# Patient Record
Sex: MALE | Race: WHITE | NOT HISPANIC OR LATINO | URBAN - METROPOLITAN AREA
[De-identification: names, ages, dates, MRNs, and addresses within clinical notes are randomized per-mention and may not be internally consistent; named-entity substitution may affect disease eponyms.]

---

## 2020-02-20 ENCOUNTER — TRANSCRIBE ORDERS (OUTPATIENT)
Dept: ADMINISTRATIVE | Facility: HOSPITAL | Age: 65
End: 2020-02-20

## 2020-02-20 DIAGNOSIS — K11.5 SIALOLITHIASIS: Primary | ICD-10-CM

## 2020-02-26 ENCOUNTER — TRANSCRIBE ORDERS (OUTPATIENT)
Dept: ADMINISTRATIVE | Facility: HOSPITAL | Age: 65
End: 2020-02-26

## 2020-02-26 DIAGNOSIS — R74.8 ELEVATED LIVER ENZYMES: Primary | ICD-10-CM

## 2020-03-09 ENCOUNTER — HOSPITAL ENCOUNTER (OUTPATIENT)
Dept: CT IMAGING | Facility: HOSPITAL | Age: 65
Discharge: HOME | End: 2020-03-09
Admitting: DENTIST

## 2020-03-09 ENCOUNTER — HOSPITAL ENCOUNTER (OUTPATIENT)
Dept: ULTRASOUND IMAGING | Facility: HOSPITAL | Age: 65
Discharge: HOME | End: 2020-03-09

## 2020-03-09 DIAGNOSIS — R74.8 ELEVATED LIVER ENZYMES: ICD-10-CM

## 2020-03-09 DIAGNOSIS — K11.5 SIALOLITHIASIS: ICD-10-CM

## 2020-03-09 PROCEDURE — 70490 CT SOFT TISSUE NECK W/O DYE: CPT

## 2020-03-09 PROCEDURE — 76705 ECHO EXAM OF ABDOMEN: CPT

## 2021-03-16 ENCOUNTER — BULK ORDERING (OUTPATIENT)
Dept: CASE MANAGEMENT | Facility: OTHER | Age: 66
End: 2021-03-16

## 2021-03-16 DIAGNOSIS — Z23 IMMUNIZATION DUE: ICD-10-CM

## 2021-05-21 RX ORDER — ROSUVASTATIN CALCIUM 10 MG/1
10 TABLET, COATED ORAL DAILY
COMMUNITY

## 2021-05-21 RX ORDER — PAROXETINE 10 MG/1
5 TABLET, FILM COATED ORAL EVERY MORNING
COMMUNITY

## 2021-05-21 RX ORDER — LISINOPRIL 10 MG/1
10 TABLET ORAL DAILY
COMMUNITY

## 2021-05-24 ENCOUNTER — ANESTHESIA EVENT (OUTPATIENT)
Dept: GASTROENTEROLOGY | Facility: HOSPITAL | Age: 66
End: 2021-05-24

## 2021-05-24 ENCOUNTER — HOSPITAL ENCOUNTER (OUTPATIENT)
Facility: HOSPITAL | Age: 66
Setting detail: HOSPITAL OUTPATIENT SURGERY
Discharge: HOME | End: 2021-05-24
Attending: SURGERY | Admitting: SURGERY

## 2021-05-24 ENCOUNTER — ANESTHESIA (OUTPATIENT)
Dept: GASTROENTEROLOGY | Facility: HOSPITAL | Age: 66
End: 2021-05-24

## 2021-05-24 VITALS
SYSTOLIC BLOOD PRESSURE: 124 MMHG | DIASTOLIC BLOOD PRESSURE: 65 MMHG | TEMPERATURE: 98.1 F | RESPIRATION RATE: 18 BRPM | HEIGHT: 72 IN | BODY MASS INDEX: 29.84 KG/M2 | WEIGHT: 220.3 LBS | OXYGEN SATURATION: 95 % | HEART RATE: 86 BPM

## 2021-05-24 DIAGNOSIS — Z12.11 SCREENING FOR COLON CANCER: ICD-10-CM

## 2021-05-24 PROCEDURE — 88305 TISSUE EXAM BY PATHOLOGIST: CPT | Performed by: SURGERY

## 2021-05-24 PROCEDURE — 25010000002 PROPOFOL 10 MG/ML EMULSION: Performed by: ANESTHESIOLOGY

## 2021-05-24 RX ORDER — BUDESONIDE AND FORMOTEROL FUMARATE DIHYDRATE 80; 4.5 UG/1; UG/1
2 AEROSOL RESPIRATORY (INHALATION)
COMMUNITY

## 2021-05-24 RX ORDER — PROPOFOL 10 MG/ML
VIAL (ML) INTRAVENOUS AS NEEDED
Status: DISCONTINUED | OUTPATIENT
Start: 2021-05-24 | End: 2021-05-24 | Stop reason: SURG

## 2021-05-24 RX ORDER — SODIUM CHLORIDE, SODIUM LACTATE, POTASSIUM CHLORIDE, CALCIUM CHLORIDE 600; 310; 30; 20 MG/100ML; MG/100ML; MG/100ML; MG/100ML
30 INJECTION, SOLUTION INTRAVENOUS CONTINUOUS PRN
Status: DISCONTINUED | OUTPATIENT
Start: 2021-05-24 | End: 2021-05-24 | Stop reason: HOSPADM

## 2021-05-24 RX ORDER — SODIUM CHLORIDE 0.9 % (FLUSH) 0.9 %
3 SYRINGE (ML) INJECTION EVERY 12 HOURS SCHEDULED
Status: DISCONTINUED | OUTPATIENT
Start: 2021-05-24 | End: 2021-05-24 | Stop reason: HOSPADM

## 2021-05-24 RX ORDER — LIDOCAINE HYDROCHLORIDE 20 MG/ML
INJECTION, SOLUTION INFILTRATION; PERINEURAL AS NEEDED
Status: DISCONTINUED | OUTPATIENT
Start: 2021-05-24 | End: 2021-05-24 | Stop reason: SURG

## 2021-05-24 RX ORDER — SODIUM CHLORIDE 0.9 % (FLUSH) 0.9 %
10 SYRINGE (ML) INJECTION AS NEEDED
Status: DISCONTINUED | OUTPATIENT
Start: 2021-05-24 | End: 2021-05-24 | Stop reason: HOSPADM

## 2021-05-24 RX ORDER — PROPOFOL 10 MG/ML
VIAL (ML) INTRAVENOUS CONTINUOUS PRN
Status: DISCONTINUED | OUTPATIENT
Start: 2021-05-24 | End: 2021-05-24 | Stop reason: SURG

## 2021-05-24 RX ADMIN — PROPOFOL 100 MG: 10 INJECTION, EMULSION INTRAVENOUS at 10:53

## 2021-05-24 RX ADMIN — SODIUM CHLORIDE, POTASSIUM CHLORIDE, SODIUM LACTATE AND CALCIUM CHLORIDE 30 ML/HR: 600; 310; 30; 20 INJECTION, SOLUTION INTRAVENOUS at 10:11

## 2021-05-24 RX ADMIN — LIDOCAINE HYDROCHLORIDE 60 MG: 20 INJECTION, SOLUTION INFILTRATION; PERINEURAL at 10:53

## 2021-05-24 RX ADMIN — PROPOFOL 140 MCG/KG/MIN: 10 INJECTION, EMULSION INTRAVENOUS at 10:53

## 2021-05-24 NOTE — ANESTHESIA POSTPROCEDURE EVALUATION
Patient: Marshal Carvalho    Procedure Summary     Date: 05/24/21 Room / Location:  [facility] /  [facility]    Anesthesia Start: 1051 Anesthesia Stop: 1119    Procedure: COLONOSCOPY to with hot snare polypectomies (N/A ) Diagnosis:     Surgeons: Erich Schwartz MD Provider: Palak Frederick MD    Anesthesia Type: MAC ASA Status: 2          Anesthesia Type: MAC    Vitals  Vitals Value Taken Time   /72 05/24/21 1127   Temp     Pulse 74 05/24/21 1127   Resp 18 05/24/21 1127   SpO2 97 % 05/24/21 1127           Post Anesthesia Care and Evaluation    Patient location during evaluation: bedside  Patient participation: complete - patient participated  Level of consciousness: awake and alert  Pain management: adequate  Airway patency: patent  Anesthetic complications: No anesthetic complications  PONV Status: controlled  Cardiovascular status: acceptable  Respiratory status: acceptable  Hydration status: acceptable

## 2021-05-24 NOTE — H&P
"Subjective   Marshal Carvalho is a 65 y.o. male who presents today for a screening colonoscopy.  On questioning, there are no GI complaints or problems.  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  Heart regular.  Chest clear.  Abdomen soft.  Rectal deferred to endoscopy.      Assessment/Plan      Plan a screening colonoscopy today.  Risks and benefits were discussed.  Patient is agreeable.  Final recommendations will follow depending on the results.    Erich Schwartz M.D.      "

## 2021-05-24 NOTE — ANESTHESIA PREPROCEDURE EVALUATION
Anesthesia Evaluation     Patient summary reviewed and Nursing notes reviewed   no history of anesthetic complications:  NPO Solid Status: > 8 hours  NPO Liquid Status: > 2 hours           Airway   Mallampati: II  TM distance: >3 FB  Neck ROM: full  No difficulty expected  Dental      Pulmonary - normal exam   (+) a smoker Former,   Cardiovascular - normal exam    (+) hypertension, hyperlipidemia,       Neuro/Psych  GI/Hepatic/Renal/Endo      Musculoskeletal     Abdominal  - normal exam   Substance History      OB/GYN          Other                        Anesthesia Plan    ASA 2     MAC   (I have reviewed the patient's history with the patient and the chart, including all pertinent laboratory results and imaging. I have explained the risks of anesthesia including but not limited to dental damage, corneal abrasion, nerve injury, MI, stroke, and death.  )  intravenous induction     Anesthetic plan, all risks, benefits, and alternatives have been provided, discussed and informed consent has been obtained with: patient.

## 2021-05-25 LAB
CYTO UR: NORMAL
LAB AP CASE REPORT: NORMAL
PATH REPORT.FINAL DX SPEC: NORMAL
PATH REPORT.GROSS SPEC: NORMAL

## 2022-04-22 ENCOUNTER — TELEPHONE (OUTPATIENT)
Dept: ORTHOPEDIC SURGERY | Facility: CLINIC | Age: 67
End: 2022-04-22

## 2022-04-22 NOTE — TELEPHONE ENCOUNTER
Caller: Marshal Carvalho    Relationship to patient: Self    Best call back number:   Chief complaint: RIGHT FOOT PAIN  Type of visit:NEW PATIENT      If rescheduling, when is the original appointment: 05/23/22    Additional notes:PATIENT HAS SOME SWELLING OF THE FOOT BUT ADV IT IS NOT SEVERE. HE HAD TRIPPED AND IS WANTING A SOONER APPT IF AVAILABLE. HE DID ADVISE HIS SCHEDULE IS FLEXIBLE. PLEASE CALL IF YOU CAN MAKE THIS HAPPEN. THANK YOU

## 2022-05-04 ENCOUNTER — OFFICE VISIT (OUTPATIENT)
Dept: ORTHOPEDIC SURGERY | Facility: CLINIC | Age: 67
End: 2022-05-04

## 2022-05-04 VITALS — WEIGHT: 217 LBS | HEIGHT: 72 IN | TEMPERATURE: 97.7 F | RESPIRATION RATE: 18 BRPM | BODY MASS INDEX: 29.39 KG/M2

## 2022-05-04 DIAGNOSIS — M79.671 FOOT PAIN, RIGHT: Primary | ICD-10-CM

## 2022-05-04 DIAGNOSIS — S92.354A NONDISPLACED FRACTURE OF FIFTH METATARSAL BONE, RIGHT FOOT, INITIAL ENCOUNTER FOR CLOSED FRACTURE: ICD-10-CM

## 2022-05-04 PROCEDURE — 99203 OFFICE O/P NEW LOW 30 MIN: CPT | Performed by: NURSE PRACTITIONER

## 2022-05-04 PROCEDURE — 73630 X-RAY EXAM OF FOOT: CPT | Performed by: NURSE PRACTITIONER

## 2022-05-04 NOTE — PROGRESS NOTES
"Patient Name: Marshal Carvalho   YOB: 1955  Referring Primary Care Physician: Stanford Salinas Jr., MD  BMI: Body mass index is 29.43 kg/m².    Chief Complaint:    Chief Complaint   Patient presents with   • Right Foot - Pain, Initial Evaluation        HPI: Fall 2 weeks ago on uneven sidewalk and had and inversion injury. Went to [facility] and was placed in a post op shoe and here for follow up. Seated work - no history of prior fracture in foot.    Marshal Carvalho is a 66 y.o. male who presents today for evaluation of   Chief Complaint   Patient presents with   • Right Foot - Pain, Initial Evaluation   .      Subjective   Medications:   Home Medications:  Current Outpatient Medications on File Prior to Visit   Medication Sig   • budesonide-formoterol (SYMBICORT) 80-4.5 MCG/ACT inhaler Inhale 2 puffs 2 (Two) Times a Day.   • lisinopril (PRINIVIL,ZESTRIL) 10 MG tablet Take 10 mg by mouth Daily.   • PARoxetine (PAXIL) 10 MG tablet Take 5 mg by mouth Every Morning.   • rosuvastatin (CRESTOR) 10 MG tablet Take 10 mg by mouth Daily.     No current facility-administered medications on file prior to visit.     Current Medications:  Scheduled Meds:  Continuous Infusions:No current facility-administered medications for this visit.    PRN Meds:.    I have reviewed the patient's medical history in detail and updated the computerized patient record.  Review and summarization of old records includes:    Past Medical History:   Diagnosis Date   • COPD (chronic obstructive pulmonary disease) (HCC)     former smoker, uses inhaler, \"very mild\" per patient report   • Elevated cholesterol    • Hypertension    • Pneumonia 2010        Past Surgical History:   Procedure Laterality Date   • COLONOSCOPY N/A 5/24/2021    Procedure: COLONOSCOPY to with hot snare polypectomies;  Surgeon: Erich Schwartz MD;  Location: [facility];  Service: General;  Laterality: N/A;  PRE: SCREENING  POST: polyps   • STOMACH SURGERY      " "after weight loss         Social History     Occupational History   • Not on file   Tobacco Use   • Smoking status: Former Smoker   • Smokeless tobacco: Not on file   Substance and Sexual Activity   • Alcohol use: Not Currently   • Drug use: Not on file   • Sexual activity: Not on file      Social History     Social History Narrative   • Not on file        Family History   Problem Relation Age of Onset   • Malig Hyperthermia Neg Hx        ROS: 14 point review of systems was performed and all other systems were reviewed and are negative except for documented findings in HPI and today's encounter.     Allergies: No Known Allergies  Constitutional:  Denies fever, shaking or chills   Eyes:  Denies change in visual acuity   HENT:  Denies nasal congestion or sore throat   Respiratory:  Denies cough or shortness of breath   Cardiovascular:  Denies chest pain or severe LE edema   GI:  Denies abdominal pain, nausea, vomiting, bloody stools or diarrhea   Musculoskeletal:  Numbness, tingling, pain, or loss of motor function only as noted above in history of present illness.  : Denies painful urination or hematuria  Integument:  Denies rash, lesion or ulceration   Neurologic:  Denies headache or focal weakness  Endocrine:  Denies lymphadenopathy  Psych:  Denies confusion or change in mental status   Hem:  Denies active bleeding    OBJECTIVE:  Physical Exam: 66 y.o. male  Wt Readings from Last 3 Encounters:   05/04/22 98.4 kg (217 lb)   05/24/21 99.9 kg (220 lb 4.8 oz)     Ht Readings from Last 1 Encounters:   05/04/22 182.9 cm (72\")     Body mass index is 29.43 kg/m².  Vitals:    05/04/22 1504   Resp: 18   Temp: 97.7 °F (36.5 °C)     Vital signs reviewed.     General Appearance:    Alert, cooperative, in no acute distress                  Eyes: conjunctiva clear  ENT: external ears and nose atraumatic  CV: no peripheral edema  Resp: normal respiratory effort  Skin: no rashes or wounds; normal turgor  Psych: mood and affect " appropriate  Lymph: no nodes appreciated  Neuro: gross sensation intact  Vascular:  Palpable peripheral pulse in noted extremity  Musculoskeletal Extremities: Varus pronating feet with point tenderness to base of 5th tarsal compartments are soft no obvious deformity cast shoe was removed sensation and pulses intact posterior tibial tendon is nontender Achilles is nontender no midfoot pain no deformity. Point tender base of 5th metatarsal. Cam short boot applied.    Radiology: Right foot 3 views done for pain with comparison films not readily available show nondisplaced base of fifth metatarsal fracture in good alignment  FACILITY:  [facility]   [facility]      AGE:66 Y          SEX:M   PATIENT NAME/:  PRABHAKAR HARRISON    1955   UNIT NUMBER:  [id number]   ACCOUNT NUMBER:  [id number]   ACCESSION NUMBER:  [id number]     Exam: 3 views right foot.     DATE:  2022     HISTORY:  Right foot pain after fall on sidewalk     COMPARISON: None.     FINDINGS: 3 views show a mildly comminuted minimally displaced fracture through the base of the fifth metatarsal. Alignment is otherwise normal. Bony mineralization is normal.     IMPRESSION: Mildly comminuted and minimally displaced fracture through the base of the fifth metatarsal.     Dictated by: Moises Ram M.D.        Assessment:     ICD-10-CM ICD-9-CM   1. Foot pain, right  M79.671 729.5   2. Nondisplaced fracture of fifth metatarsal bone, right foot, initial encounter for closed fracture  S92.354A 825.25        Procedures    Pt needs to off load with a walker he was not comfortable using a walker as he is to go up and down stairs compromise and is using a cane - short fracture walker boot and follow up with [facility] discussed no use of ibuprofen and over-the-counter Tylenol was good for pain  MDM/Plan:   The diagnosis(es), natural history, pathophysiology and treatment for diagnosis(es) were discussed. Opportunity given and  questions answered.  Biomechanics of pertinent body areas discussed.  When appropriate, the use of ambulatory aids discussed.  EXERCISES:  Advice on benefits of, and types of regular/moderate exercise pertaining to orthopedic diagnosis(es).  MEDICATIONS:  The risks, benefits, warnings,side effects and alternatives of medications discussed.  Inflammation/pain control; with cold, heat, elevation and/or liniments discussed as appropriate  MEDICAL RECORDS reviewed from other provider(s) for past and current medical history pertinent to this complaint.      5/4/2022    Much of this encounter note is an electronic transcription/translation of spoken language to printed text. The electronic translation of spoken language may permit erroneous, or at times, nonsensical words or phrases to be inadvertently transcribed; Although I have reviewed the note for such errors, some may still exist

## 2022-05-25 ENCOUNTER — TELEPHONE (OUTPATIENT)
Dept: ORTHOPEDIC SURGERY | Facility: CLINIC | Age: 67
End: 2022-05-25

## 2022-05-25 ENCOUNTER — OFFICE VISIT (OUTPATIENT)
Dept: ORTHOPEDIC SURGERY | Facility: CLINIC | Age: 67
End: 2022-05-25

## 2022-05-25 VITALS — HEIGHT: 72 IN | BODY MASS INDEX: 29.39 KG/M2 | WEIGHT: 217 LBS

## 2022-05-25 DIAGNOSIS — S92.354A CLOSED NONDISPLACED FRACTURE OF FIFTH METATARSAL BONE OF RIGHT FOOT, INITIAL ENCOUNTER: Primary | ICD-10-CM

## 2022-05-25 PROCEDURE — 99214 OFFICE O/P EST MOD 30 MIN: CPT | Performed by: ORTHOPAEDIC SURGERY

## 2022-05-25 PROCEDURE — 73630 X-RAY EXAM OF FOOT: CPT | Performed by: ORTHOPAEDIC SURGERY

## 2022-05-25 NOTE — TELEPHONE ENCOUNTER
PATIENT CANNOT MAKE THE APPT THAT [facility] WAS GOING TO SET FOR HIM ON 6/8/22 AT 11:30 (PER LAMAR ORDAZ). HE IS LEAVING ON [place] THAT DAY AND HE SAID M[facility] WANTED TO SEE HIM BEFORE THEN. PLEASE ADVISE.

## 2022-05-25 NOTE — PROGRESS NOTES
"   New Patient Complaint      Patient: Marshal Carvalho  YOB: 1955 66 y.o. male  Medical Record Number: [id number]    Chief Complaints: I hurt my foot    History of Present Illness: Patient injured his right foot around 2022 when he stepped off of an uneven sidewalk at a  service.  Initially quite of pain swelling and bruising in the lateral aspect of the right foot but really none to the ankle.    He was seen at a [place] facility on 2022 with x-rays which showed a minimally displaced fracture through the base of the fifth metatarsal per report.    He saw E[facility] on 2022 and has been continuing to use a boot and a cane with improved pain in the right foot with minimal \"pinch\" feeling of pain at the fifth metatarsal but not at the ankle.         HPI    Allergies: No Known Allergies    Medications:   Current Outpatient Medications on File Prior to Visit   Medication Sig   • budesonide-formoterol (SYMBICORT) 80-4.5 MCG/ACT inhaler Inhale 2 puffs 2 (Two) Times a Day.   • lisinopril (PRINIVIL,ZESTRIL) 10 MG tablet Take 10 mg by mouth Daily.   • PARoxetine (PAXIL) 10 MG tablet Take 5 mg by mouth Every Morning.   • rosuvastatin (CRESTOR) 10 MG tablet Take 10 mg by mouth Daily.     No current facility-administered medications on file prior to visit.       Past Medical History:   Diagnosis Date   • COPD (chronic obstructive pulmonary disease) ([facility])     former smoker, uses inhaler, \"very mild\" per patient report   • Elevated cholesterol    • Hypertension    • Pneumonia      Past Surgical History:   Procedure Laterality Date   • COLONOSCOPY N/A 2021    Procedure: COLONOSCOPY to with hot snare polypectomies;  Surgeon: Erich Schwartz MD;  Location: [facility] ENDOSCOPY;  Service: General;  Laterality: N/A;  PRE: SCREENING  POST: polyps   • STOMACH SURGERY      after weight loss      Social History     Occupational History   • Not on file   Tobacco Use   • Smoking status: Former Smoker   • " "Smokeless tobacco: Never Used   Vaping Use   • Vaping Use: Never used   Substance and Sexual Activity   • Alcohol use: Not Currently   • Drug use: Defer   • Sexual activity: Defer      Social History     Social History Narrative   • Not on file     Family History   Problem Relation Age of Onset   • Malig Hyperthermia Neg Hx        Review of Systems: 14 point review of systems performed, positive pertinent findings identified in HPI. All remaining systems negative     Review of Systems      Physical Exam:   Vitals:    05/25/22 1117   Weight: 98.4 kg (217 lb)   Height: 182.9 cm (72\")     Physical Exam   Constitutional: pleasant, well developed   Eyes: sclera non icteric  Hearing : adequate for exam  Cardiovascular: palpable pulses in right foot, right calf/ thigh NT without sign of DVT  Respiratoy: breathing unlabored   Neurological: grossly sensate to LT throughout right LE  Psychiatric: oriented with normal mood and affect.   Lymphatic: No palpable popliteal lymphadenopathy right LE  Skin: intact throughout right leg/foot  Musculoskeletal: On exam he has moderate to severe pes planus which preceded this injury.  There was minimal if any discomfort to palpation at the base of the fifth metatarsal and only \"pinch\" feeling to palpation.  He was nontender about the ankle  Physical Exam  Ortho Exam    Radiology: 3 views of the right foot ordered evaluate fracture reviewed and compared with previous x-rays from 5/4/2022 and reviewed report from foot x-rays from [place] from 4/23/2022 which had described a mildly comminuted minimally displaced fracture to the base of the fifth metatarsal.    Today's x-rays shows no change in alignment compared to previous x-rays of the fracture at the proximal extent of the fifth metatarsal without clear intra-articular extension.  There has been no change in alignment does appear to be some callus formation.    Assessment/Plan: 1.  Right fifth metatarsal base fracture.    Reviewed " treatment options with him today and although we do not see clear healing yet there does appear to be some callus forming and his symptoms have improved and has not had any radiographic changes.    Therefore reviewed with him I would not recommend any surgical treatment at this time with the understanding that should it fail to heal and be symptomatic or displace could still require surgical treatment.    I do not see any signs of injury to the ankle but does have chronic pes planus with probable posterior tib tendon dysfunction but nothing I would address at this point regarding that.    Would recommend that he avoid any oral anti-inflammatories other than Tylenol so as to minimize bone healing inhibition.    We will have him continue with his boot and cane and check him back in 2 weeks before he leaves for [facility].    He is cousins with Adelina Trevino, a former patient of mine.

## 2022-05-26 NOTE — TELEPHONE ENCOUNTER
Called patient and left message for him to call back to get him in to see Dr. Peoples before pt goes out of town on the 8th.  Patient called back and we have him scheduled with Dr. Peoples on 6/6.

## 2022-06-06 ENCOUNTER — OFFICE VISIT (OUTPATIENT)
Dept: ORTHOPEDIC SURGERY | Facility: CLINIC | Age: 67
End: 2022-06-06

## 2022-06-06 VITALS — BODY MASS INDEX: 29.39 KG/M2 | HEIGHT: 72 IN | TEMPERATURE: 98.4 F | WEIGHT: 217 LBS

## 2022-06-06 DIAGNOSIS — E55.9 VITAMIN D DEFICIENCY: Primary | ICD-10-CM

## 2022-06-06 DIAGNOSIS — S92.354D CLOSED NONDISPLACED FRACTURE OF FIFTH METATARSAL BONE OF RIGHT FOOT WITH ROUTINE HEALING, SUBSEQUENT ENCOUNTER: ICD-10-CM

## 2022-06-06 DIAGNOSIS — Z09 FOLLOW-UP EXAM: ICD-10-CM

## 2022-06-06 PROCEDURE — 99213 OFFICE O/P EST LOW 20 MIN: CPT | Performed by: ORTHOPAEDIC SURGERY

## 2022-06-06 PROCEDURE — 73630 X-RAY EXAM OF FOOT: CPT | Performed by: ORTHOPAEDIC SURGERY

## 2022-06-06 NOTE — PROGRESS NOTES
"Foot Follow Up      Patient: Marshal Carvalho    YOB: 1955 66 y.o. male    Chief Complaints: Foot \"feeling fine\"    History of Present Illness: Patient injured his right foot around 2022 when he stepped off of an uneven sidewalk at a  service.  Initially quite of pain swelling and bruising in the lateral aspect of the right foot but really none to the ankle.     He was seen at a [place] facility on 2022 with x-rays which showed a minimally displaced fracture through the base of the fifth metatarsal per report.     He saw Eul[facility] on 2022     I saw him initially on 2022 and he had has been continuing to use a boot and a cane with improved pain in the right foot with minimal \"pinch\" feeling of pain at the fifth metatarsal but not at the ankle.     Reviewed with him that I do not see any clear healing yet but has not had any change in alignment and symptoms were improving and would not recommend surgical treatment at that time.    I do not see any signs of injury to the ankle that did feel he probably had some chronic posterior tib tendon dysfunction but nothing I would address at that point regarding that.    He was instructed to continue with his boot and cane and backup for his trip to [place]..  He is seen back today stating that he continues to do better.  He only used the cane for about a week after last saw him and quit using it because he was feeling better.  He has been using his boot and really has no appreciable complaints of pain in his right foot and ankle.        He is cousins with Adelina Trevino, a former pt of mine  HPI    ROS: Foot pain  Past Medical History:   Diagnosis Date   • COPD (chronic obstructive pulmonary disease) ([facility])     former smoker, uses inhaler, \"very mild\" per patient report   • Elevated cholesterol    • Hypertension    • Pneumonia      Physical Exam:   Vitals:    22 1338   Temp: 98.4 °F (36.9 °C)   TempSrc: Temporal   Weight: 98.4 kg (217 " "lb)   Height: 182.9 cm (72.01\")     Well developed with normal mood.  He was ambulating without assistive device other than his boot.  He had significant pes planus but had really no focal discomfort at all to palpation over the base of the right fifth metatarsal.  He was able to actively lena well      Radiology: 3 views of the right foot ordered evaluate fracture alignment reviewed and compared to previous x-rays.  Fracture line is still persistent with some mild gapping at the lateral border measuring about 1.5-2 . I do not see any major displacement compared with previous x-rays however      Assessment/Plan: 1.  Right fifth metatarsal base fracture    We discussed treatment going forward and although I do not see as much healing I would like to see on x-rays he is not symptomatic and I would not recommend surgical treatment or further work-up at this time but understands that if he does not heal and is symptomatic would require surgical treatment.    -Continue with at least his boot for another week and then may start transitioning initially around the house to a postoperative shoe and if does well with that then out of the house.    I would like to see a little more healing than what we have so he may be vitamin D deficient so we will send in for a vitamin D level and may need to augment that.    Anything worsens to let me know otherwise I will see him back in 3 weeks with x-rays of his right foot.   "

## 2022-06-27 ENCOUNTER — OFFICE VISIT (OUTPATIENT)
Dept: ORTHOPEDIC SURGERY | Facility: CLINIC | Age: 67
End: 2022-06-27

## 2022-06-27 VITALS — BODY MASS INDEX: 29.39 KG/M2 | TEMPERATURE: 97.1 F | WEIGHT: 217 LBS | HEIGHT: 72 IN

## 2022-06-27 DIAGNOSIS — S92.354A NONDISPLACED FRACTURE OF FIFTH METATARSAL BONE, RIGHT FOOT, INITIAL ENCOUNTER FOR CLOSED FRACTURE: ICD-10-CM

## 2022-06-27 DIAGNOSIS — E55.9 VITAMIN D DEFICIENCY: ICD-10-CM

## 2022-06-27 DIAGNOSIS — R52 PAIN: Primary | ICD-10-CM

## 2022-06-27 PROCEDURE — 99213 OFFICE O/P EST LOW 20 MIN: CPT | Performed by: ORTHOPAEDIC SURGERY

## 2022-06-27 PROCEDURE — 73630 X-RAY EXAM OF FOOT: CPT | Performed by: ORTHOPAEDIC SURGERY

## 2022-06-27 RX ORDER — FLUTICASONE PROPIONATE AND SALMETEROL 100; 50 UG/1; UG/1
POWDER RESPIRATORY (INHALATION)
COMMUNITY
Start: 2022-05-07

## 2022-06-27 NOTE — PROGRESS NOTES
"Foot Follow Up      Patient: Marshal Carvalho    YOB: 1955 66 y.o. male    Chief Complaints: Foot has no pain    History of Present Illness:Patient injured his right foot around 2022 when he stepped off of an uneven sidewalk at a  service.  Initially quite of pain swelling and bruising in the lateral aspect of the right foot but really none to the ankle.     He was seen at a [place] facility on 2022 with x-rays which showed a minimally displaced fracture through the base of the fifth metatarsal per report.     He saw E[facility] on 2022      I saw him initially on 2022 and he had has been continuing to use a boot and a cane with improved pain in the right foot with minimal \"pinch\" feeling of pain at the fifth metatarsal but not at the ankle.      Reviewed with him that I do not see any clear healing yet but has not had any change in alignment and symptoms were improving and would not recommend surgical treatment at that time.     I do not see any signs of injury to the ankle that did feel he probably had some chronic posterior tib tendon dysfunction but nothing I would address at that point regarding that.     He was instructed to continue with his boot and cane and see him back befire his trip to [place].      He was seen on 2022 stating that he continued to do better.  He only used the cane for about a week after previously that saw him and quit using it because he was feeling better. He had been using his boot and really had no appreciable complaints of pain in his right foot and ankle.     Reviewed with him that although I would like to see more healing  he was having any pain and I did not recommend any surgical treatment as he was not symptomatic and was improving.  He was instructed to continue with his boot for another week then transition to postoperative shoe and have his vitamin D level checked.    He is seen back today and forgot to have his vitamin D level checked.  " "He has been using his postoperative shoe but feels that there was some off balance little bit causing some strain in his leg and was having difficulty getting used to using a cane.    He has no complaints at all of pain in the right foot.  HPI    ROS: No foot pain  Past Medical History:   Diagnosis Date   • COPD (chronic obstructive pulmonary disease) (HCC)     former smoker, uses inhaler, \"very mild\" per patient report   • Elevated cholesterol    • Hypertension    • Pneumonia 2010     Physical Exam:   Vitals:    06/27/22 1327   Temp: 97.1 °F (36.2 °C)   Weight: 98.4 kg (217 lb)   Height: 182.9 cm (72\")   PainSc: 0-No pain     Well developed with normal mood.  On exam he has moderate pes planus there was no discomfort at all to palpation at the base of the right fifth metatarsal and he was grossly sensate light touch in the right foot.      Radiology: 3 views of the right foot ordered evaluate fracture reviewed and compared to previous x-rays.  The fracture at the proximal aspect of the fifth metatarsal is still evident and demonstrates some gapping of about 3 mm laterally.  There are some questionable resorption at the fracture site but it does appear to be filling in over this more medially.      Assessment/Plan: 1.  Fifth metatarsal base fracture.    Reviewed x-ray findings with him and that there does least seem to be partial healing and he is not at all symptomatic so would not recommend any surgical treatment at this time reviewed with him this may take several months or longer to heal.    Recommend he continue with his postoperative shoe and will have Mini talk to him about getting an even up for use on the contralateral foot and stick activities of daily living only.  Instructed not to try to go swimming.    He is can get his vitamin D level checked and we may need to augment that.    We will see him back in a month with x-rays of his right foot.  "

## 2022-07-28 ENCOUNTER — OFFICE VISIT (OUTPATIENT)
Dept: ORTHOPEDIC SURGERY | Facility: CLINIC | Age: 67
End: 2022-07-28

## 2022-07-28 VITALS — HEIGHT: 72 IN | TEMPERATURE: 96.6 F | WEIGHT: 217 LBS | BODY MASS INDEX: 29.39 KG/M2

## 2022-07-28 DIAGNOSIS — E55.9 VITAMIN D DEFICIENCY: ICD-10-CM

## 2022-07-28 DIAGNOSIS — R52 PAIN: Primary | ICD-10-CM

## 2022-07-28 DIAGNOSIS — S92.354G CLOSED NONDISPLACED FRACTURE OF FIFTH METATARSAL BONE OF RIGHT FOOT WITH DELAYED HEALING, SUBSEQUENT ENCOUNTER: ICD-10-CM

## 2022-07-28 PROBLEM — S92.354D NONDISPLACED FRACTURE OF FIFTH RIGHT METATARSAL BONE WITH ROUTINE HEALING: Status: ACTIVE | Noted: 2022-07-28

## 2022-07-28 PROCEDURE — 73630 X-RAY EXAM OF FOOT: CPT | Performed by: ORTHOPAEDIC SURGERY

## 2022-07-28 PROCEDURE — 99213 OFFICE O/P EST LOW 20 MIN: CPT | Performed by: ORTHOPAEDIC SURGERY

## 2022-07-28 NOTE — PROGRESS NOTES
"Foot Follow Up      Patient: Marshal Carvalho    YOB: 1955 66 y.o. male    Chief Complaints: Foot does not hurt    History of Present Illness:Patient injured his right foot around 2022 when he stepped off of an uneven sidewalk at a  service.  Initially quite of pain swelling and bruising in the lateral aspect of the right foot but really none to the ankle.     He was seen at a [facility] facility on 2022 with x-rays which showed a minimally displaced fracture through the base of the fifth metatarsal per report.     He saw [facility] on 2022      I saw him initially on 2022 and he had has been continuing to use a boot and a cane with improved pain in the right foot with minimal \"pinch\" feeling of pain at the fifth metatarsal but not at the ankle.      Reviewed with him that I do not see any clear healing yet but has not had any change in alignment and symptoms were improving and would not recommend surgical treatment at that time.     I do not see any signs of injury to the ankle that did feel he probably had some chronic posterior tib tendon dysfunction but nothing I would address at that point regarding that.     He was instructed to continue with his boot and cane and see him back befire his trip to [place].     He was seen on 2022 stating that he continued to do better.  He only used the cane for about a week after previously that saw him and quit using it because he was feeling better. He had been using his boot and really had no appreciable complaints of pain in his right foot and ankle.      Reviewed with him that although I would like to see more healing  he was having any pain and I did not recommend any surgical treatment as he was not symptomatic and was improving.  He was instructed to continue with his boot for another week then transition to postoperative shoe and have his vitamin D level checked.     He was seen on 2022 and forgot to have his vitamin D level " "checked.  He had been using his postoperative shoe but felt that he was some off balance little bit causing some strain in his leg and was having difficulty getting used to using a cane.     He had no complaints at all of pain in the right foot..    Again although I do not see as much healing as  I would like he was not all symptomatic and did not recommend any surgical treatment.  He was instructed to use the postoperative shoe and we will see back again even up and not to do any swelling and get his vitamin D level checked.    He is seen back today stating that the even up did not really help and the postop shoe still causing some strain to his right leg.  He has not done any barefoot ambulation as he \"never does\" and reports she has no pain at all in his right foot.  He did get his vitamin D level checked through his PCP several weeks ago and was told that it was 29.  He has been taking some over-the-counter supplements but is unsure of what dosage he is taking but taking several per day.  Pain is rated 0 out of 10  HPI    ROS: No foot pain  Past Medical History:   Diagnosis Date   • COPD (chronic obstructive pulmonary disease) (HCC)     former smoker, uses inhaler, \"very mild\" per patient report   • Elevated cholesterol    • Hypertension    • Pneumonia 2010     Physical Exam:   Vitals:    07/28/22 1423   Temp: 96.6 °F (35.9 °C)   Weight: 98.4 kg (217 lb)   Height: 182.9 cm (72\")   PainSc: 0-No pain     Well developed with normal mood.  On exam he has moderate pes planus.  He has Apsley no tenderness to palpation at the base of the right fifth metatarsal and was grossly sensate light touch.      Radiology: 3 views of the right foot ordered evaluate fracture alignment reviewed and compared to previous x-rays.  Fracture line is still evident but does not show displacement compared with previous x-rays still with some gapping plantarly and laterally about 2 mm.      Assessment/Plan:1.  Fifth metatarsal base " fracture.     We discussed treatment going forward and again although would like to see more healing on the x-ray he is having absolutely no pain to that area and so would not recommend any surgical treatment or further work-up at this time understanding that if it becomes symptomatic it may require further work-up.    He is anxious to get out of the postoperative shoe and has been 3 months now.  So I think it is time for a trial of life.    He will start weaning out of the postoperative shoe into a sturdy accommodative athletic shoe and avoid barefoot ambulation.    He may get in the water and try to do some swimming and see how things do.    He is going to call and let us know what dosage and amount of vitamin D he is taking daily.    If he has any onset of pain to get back in a postop shoe get office and let us know otherwise I will see him back in a month with x-rays of his right foot.

## 2022-08-25 ENCOUNTER — OFFICE VISIT (OUTPATIENT)
Dept: ORTHOPEDIC SURGERY | Facility: CLINIC | Age: 67
End: 2022-08-25

## 2022-08-25 VITALS — HEIGHT: 72 IN | TEMPERATURE: 96.4 F | BODY MASS INDEX: 29.45 KG/M2 | WEIGHT: 217.4 LBS

## 2022-08-25 DIAGNOSIS — Z09 FOLLOW-UP EXAM: Primary | ICD-10-CM

## 2022-08-25 DIAGNOSIS — S92.354G CLOSED NONDISPLACED FRACTURE OF FIFTH METATARSAL BONE OF RIGHT FOOT WITH DELAYED HEALING, SUBSEQUENT ENCOUNTER: ICD-10-CM

## 2022-08-25 PROCEDURE — 73630 X-RAY EXAM OF FOOT: CPT | Performed by: ORTHOPAEDIC SURGERY

## 2022-08-25 PROCEDURE — 99213 OFFICE O/P EST LOW 20 MIN: CPT | Performed by: ORTHOPAEDIC SURGERY

## 2022-08-25 NOTE — PROGRESS NOTES
"Foot Follow Up      Patient: Marshal Carvalho    YOB: 1955 66 y.o. male    Chief Complaints: Foot \"has no pain\"    History of Present Illness:Patient injured his right foot around 2022 when he stepped off of an uneven sidewalk at a  service.  Initially quite of pain swelling and bruising in the lateral aspect of the right foot but really none to the ankle.     He was seen at a [facility] facility on 2022 with x-rays which showed a minimally displaced fracture through the base of the fifth metatarsal per report.     He saw [facility] on 2022      I saw him initially on 2022 and he had has been continuing to use a boot and a cane with improved pain in the right foot with minimal \"pinch\" feeling of pain at the fifth metatarsal but not at the ankle.      Reviewed with him that I do not see any clear healing yet but has not had any change in alignment and symptoms were improving and would not recommend surgical treatment at that time.     I do not see any signs of injury to the ankle that did feel he probably had some chronic posterior tib tendon dysfunction but nothing I would address at that point regarding that.     He was instructed to continue with his boot and cane and see him back befire his trip to [place].      He was seen on 2022 stating that he continued to do better.  He only used the cane for about a week after previously that saw him and quit using it because he was feeling better. He had been using his boot and really had no appreciable complaints of pain in his right foot and ankle.      Reviewed with him that although I would like to see more healing  he was having any pain and I did not recommend any surgical treatment as he was not symptomatic and was improving.  He was instructed to continue with his boot for another week then transition to postoperative shoe and have his vitamin D level checked.      He was seen on 2022 and forgot to have his vitamin D level " "checked.  He had been using his postoperative shoe but felt that he was some off balance little bit causing some strain in his leg and was having difficulty getting used to using a cane.     He had no complaints at all of pain in the right foot..     Again although I do not see as much healing as  I would like he was not all symptomatic and did not recommend any surgical treatment.  He was instructed to use the postoperative shoe and we will see back again even up and not to do any swelling and get his vitamin D level checked.     He was seen on 7/20/2022 stating that the even up did not really help and the postop shoe was still causing some strain to his right leg.  He had not done any barefoot ambulation as he \"never does\" and reported that he had no pain at all in his right foot.      He did get his vitamin D level checked through his PCP several weeks prior and was told that it was 29.  He had been taking some over-the-counter supplements but was unsure of what dosage he was taking but taking several per day.  Pain was rated 0 out of 10.    We reviewed treatment options and although I did not see as much thing I would like to have some absolutely no pain in that area and did not desire any surgical treatment nor did I recommend that given his paucity of symptoms.  I felt he can get out of his postoperative shoe for a \"trial of life\" and was allowed to do as such and was allowed to do some swimming and see how that did and was then to call us with his vitamin D dosage.    He is seen back today stating that he has no pain at all.  He is been back into shoes and still had some feeling of strain to his right leg but saw therapist when he was visiting out in [place] we gave him some exercises to do and he felt like his strength and the pain was improving but not resolved completely in his leg and having no pain at all in his foot.  He has been very active around the house and has been swimming.  He has no pain " "with activities or at rest.    He continues to take vitamin D but was still unsure what his dosage was    He has been doing the home exercises that the therapist gave him.  HPI    ROS: No foot pain  Past Medical History:   Diagnosis Date   • COPD (chronic obstructive pulmonary disease) (HCC)     former smoker, uses inhaler, \"very mild\" per patient report   • Elevated cholesterol    • Hypertension    • Pneumonia 2010     Physical Exam:   Vitals:    08/25/22 1446   Temp: 96.4 °F (35.8 °C)   Weight: 98.6 kg (217 lb 6.4 oz)   Height: 182.9 cm (72\")   PainSc: 0-No pain     Well developed with normal mood.  On exam he has planovalgus alignment to the right hindfoot and absolutely no tenderness at all with palpation at the base of the fifth metatarsal.      Radiology: 3 views of the right foot ordered evaluate fracture reviewed and compared to previous x-rays.  The fifth metatarsal proximal fracture is still somewhat evident but without displacement compared to previous x-rays difficult to say if there is been interval healing and looks at least partially healed.      Assessment/Plan: 1.  Right fifth metatarsal base fracture    We again reviewed treatment going forward and is not having any symptoms and may be least partially healed and would certainly not recommend surgical treatment given his lack of symptoms nor does he desire it especially as he is now making some strides with improving the strength and pain in his leg with doing home therapy exercises.    We discussed formal therapy here and decided to hold off on that but if he fails to continue to progress he will certainly call me and let me know and we can make a referral for some therapy.    He is also to continue with his vitamin D and will call let me know what dose he is on.    If he has any onset of any symptoms of pain in his right foot he will get office and let me know otherwise I will see him back in 6 weeks with x-rays of his right foot.  "

## 2023-12-12 ENCOUNTER — HOSPITAL ENCOUNTER (EMERGENCY)
Facility: HOSPITAL | Age: 68
Discharge: HOME | End: 2023-12-12
Attending: EMERGENCY MEDICINE | Admitting: EMERGENCY MEDICINE
Payer: MEDICARE

## 2023-12-12 VITALS
RESPIRATION RATE: 18 BRPM | HEART RATE: 96 BPM | BODY MASS INDEX: 28.44 KG/M2 | OXYGEN SATURATION: 94 % | SYSTOLIC BLOOD PRESSURE: 136 MMHG | WEIGHT: 210 LBS | DIASTOLIC BLOOD PRESSURE: 92 MMHG | TEMPERATURE: 97 F | HEIGHT: 72 IN

## 2023-12-12 DIAGNOSIS — S46.912A LEFT SHOULDER STRAIN, INITIAL ENCOUNTER: Primary | ICD-10-CM

## 2023-12-12 PROCEDURE — 25010000002 KETOROLAC TROMETHAMINE PER 15 MG: Performed by: PHYSICIAN ASSISTANT

## 2023-12-12 PROCEDURE — 96372 THER/PROPH/DIAG INJ SC/IM: CPT

## 2023-12-12 PROCEDURE — 99283 EMERGENCY DEPT VISIT LOW MDM: CPT

## 2023-12-12 RX ORDER — NAPROXEN 500 MG/1
500 TABLET ORAL 2 TIMES DAILY PRN
Qty: 15 TABLET | Refills: 0 | Status: SHIPPED | OUTPATIENT
Start: 2023-12-12 | End: 2023-12-12 | Stop reason: SDUPTHER

## 2023-12-12 RX ORDER — LIDOCAINE 50 MG/G
1 PATCH TOPICAL EVERY 24 HOURS
Qty: 6 PATCH | Refills: 0 | Status: SHIPPED | OUTPATIENT
Start: 2023-12-12

## 2023-12-12 RX ORDER — KETOROLAC TROMETHAMINE 30 MG/ML
30 INJECTION, SOLUTION INTRAMUSCULAR; INTRAVENOUS ONCE
Status: COMPLETED | OUTPATIENT
Start: 2023-12-12 | End: 2023-12-12

## 2023-12-12 RX ORDER — LIDOCAINE 50 MG/G
1 PATCH TOPICAL EVERY 24 HOURS
Qty: 6 PATCH | Refills: 0 | Status: SHIPPED | OUTPATIENT
Start: 2023-12-12 | End: 2023-12-12 | Stop reason: SDUPTHER

## 2023-12-12 RX ORDER — NAPROXEN 500 MG/1
500 TABLET ORAL 2 TIMES DAILY PRN
Qty: 15 TABLET | Refills: 0 | Status: SHIPPED | OUTPATIENT
Start: 2023-12-12

## 2023-12-12 RX ORDER — LIDOCAINE 4 G/G
1 PATCH TOPICAL
Status: DISCONTINUED | OUTPATIENT
Start: 2023-12-12 | End: 2023-12-13 | Stop reason: HOSPADM

## 2023-12-12 RX ADMIN — KETOROLAC TROMETHAMINE 30 MG: 30 INJECTION, SOLUTION INTRAMUSCULAR; INTRAVENOUS at 20:33

## 2023-12-12 RX ADMIN — LIDOCAINE 1 PATCH: 4 PATCH TOPICAL at 20:35

## 2023-12-13 NOTE — ED TRIAGE NOTES
Patient to ED from [facility]. Patient had a fall 3 weeks ago and had left shoulder pain following. Patient went to [facility] today because he began to have left arm pain. [facility] nurse practitioner called triage prior to patient arrival and reported that left humerus XR was negative and wants patient to be ruled out for blood clot.

## 2023-12-13 NOTE — ED PROVIDER NOTES
MD ATTESTATION NOTE    The COLLIN and I have discussed this patient's history, physical exam, and treatment plan.  I have reviewed the documentation and personally had a face to face interaction with the patient. I affirm the documentation and agree with the treatment and plan.  The attached note describes my personal findings.      I provided a substantive portion of the care of the patient.  I personally performed the physical exam in its entirety, and below are my findings.      Brief HPI: Patient complains of left upper arm pain for the past 3 weeks.  Pain initially began after he fell and landed on his left shoulder.  Pain is worse with raising his left arm.  Denies numbness/tingling/weakness in his left arm.    PHYSICAL EXAM  ED Triage Vitals   Temp Heart Rate Resp BP SpO2   12/12/23 1909 12/12/23 1909 12/12/23 1910 12/12/23 1910 12/12/23 1909   97 °F (36.1 °C) 111 16 130/80 94 %      Temp src Heart Rate Source Patient Position BP Location FiO2 (%)   12/12/23 1909 12/12/23 1909 -- -- --   Tympanic Monitor            GENERAL: Awake, alert, oriented x 3.  Well-developed, nourished male.  Resting comfortably in no acute distress  HENT: nares patent  EYES: no scleral icterus  CV: regular rhythm, normal rate, normal left radial pulse  RESPIRATORY: normal effort, clear to auscultation bilaterally  ABDOMEN: soft, nontender, nondistended  MUSCULOSKELETAL: There is mild tenderness over the lateral aspect of the left upper arm.  Remainder of the left upper extremity is nontender.  There is pain with shoulder extension  NEURO: Normal strength and light touch sensation in the left upper extremity  PSYCH:  calm, cooperative  SKIN: warm, dry    Vital signs and nursing notes reviewed.    Patient had x-rays of the right humerus done earlier today and these were negative acute.    Plan: Plan is for symptomatic treatment.  Patient will be referred to orthopedics for follow-up.       Win Mcfadden MD  12/12/23 8703

## 2023-12-13 NOTE — ED NOTES
Pt c/o left  upper arm pain that started approx 3wks ago. Pt was at gym when he slipped and landed on left arm. Pt was seen and had neg xray. Reports increase pain over the last couple days. Pain increases with purposeful movement. Distal pulses strong

## 2023-12-13 NOTE — ED PROVIDER NOTES
EMERGENCY DEPARTMENT ENCOUNTER    Room Number:  T02/02  Date of encounter:  12/12/2023  PCP: Stanford Salinas Jr., MD  Historian: Patient  Full history not obtainable due to: None    HPI:  Chief Complaint: Arm pain    Context: Marshal Carvalho is a 68 y.o. male who presents to the ED c/o left arm pain that began 3 weeks ago after a fall in his gym. Patient states he goes to the gym once a week and has a  that helps him workout. Patient continued to go to the gym after the fall and thinks he may have hurt it more by continuing to work out. The pain is sharp in quality localized to the upper arm, outer shoulder area. Patient has tried Aleve with little relief of symptoms and is requesting something for the pain to help him sleep tonight. Pain is a 10/10 with movement of the arm. Patient denies fever, nausea, or vomiting.       MEDICAL RECORD REVIEW:    Upon review of the medical record it appears the patient was evaluated in the office with orthopedics for follow-up exam after a closed nondisplaced fracture of the fifth metatarsal of the right foot on 8/25/2022.      PAST MEDICAL HISTORY    Active Ambulatory Problems     Diagnosis Date Noted    Nondisplaced fracture of fifth metatarsal bone, right foot, initial encounter for closed fracture 05/25/2022    Vitamin D deficiency 06/06/2022    Nondisplaced fracture of fifth right metatarsal bone with routine healing 07/28/2022     Resolved Ambulatory Problems     Diagnosis Date Noted    No Resolved Ambulatory Problems     Past Medical History:   Diagnosis Date    COPD (chronic obstructive pulmonary disease)     Elevated cholesterol     Hypertension     Pneumonia 2010         PAST SURGICAL HISTORY  Past Surgical History:   Procedure Laterality Date    COLONOSCOPY N/A 5/24/2021    Procedure: COLONOSCOPY to with hot snare polypectomies;  Surgeon: Erich Schwartz MD;  Location: [facility] ENDOSCOPY;  Service: General;  Laterality: N/A;  PRE: SCREENING  POST: polyps     STOMACH SURGERY      after weight loss          FAMILY HISTORY  Family History   Problem Relation Age of Onset    Malig Hyperthermia Neg Hx          SOCIAL HISTORY  Social History     Socioeconomic History    Marital status: Unknown   Tobacco Use    Smoking status: Former    Smokeless tobacco: Never   Vaping Use    Vaping Use: Never used   Substance and Sexual Activity    Alcohol use: Not Currently    Drug use: Defer    Sexual activity: Defer         ALLERGIES  Patient has no known allergies.        REVIEW OF SYSTEMS    All systems reviewed and marked as negative except as listed in HPI     PHYSICAL EXAM    I have reviewed the triage vital signs and nursing notes.    ED Triage Vitals   Temp Heart Rate Resp BP SpO2   12/12/23 1909 12/12/23 1909 12/12/23 1910 12/12/23 1910 12/12/23 1909   97 °F (36.1 °C) 111 16 130/80 94 %      Temp src Heart Rate Source Patient Position BP Location FiO2 (%)   12/12/23 1909 12/12/23 1909 -- -- --   Tympanic Monitor          Physical Exam  Constitutional:       General: He is not in acute distress.     Appearance: He is well-developed.   HENT:      Head: Normocephalic and atraumatic.   Eyes:      General: No scleral icterus.     Conjunctiva/sclera: Conjunctivae normal.   Neck:      Trachea: No tracheal deviation.   Cardiovascular:      Rate and Rhythm: Normal rate and regular rhythm.      Pulses:           Radial pulses are 2+ on the right side and 2+ on the left side.   Pulmonary:      Effort: Pulmonary effort is normal.      Breath sounds: Normal breath sounds.   Abdominal:      Palpations: Abdomen is soft.      Tenderness: There is no abdominal tenderness. There is no guarding.   Musculoskeletal:         General: No deformity.      Left shoulder: No swelling.      Left upper arm: No swelling, tenderness or bony tenderness.        Arms:       Cervical back: Normal range of motion.      Comments: Pain is localized to upper outer arm with movement of the arm.  No tenderness with  palpation.    strength 5/5 bilaterally  ROM decreased on the left side with forward arm raise and side arm raise.    Lymphadenopathy:      Cervical: No cervical adenopathy.   Skin:     General: Skin is warm and dry.   Neurological:      Mental Status: He is alert and oriented to person, place, and time.   Psychiatric:         Behavior: Behavior normal.         Vital signs and nursing notes reviewed.            LAB RESULTS  No results found for this or any previous visit (from the past 24 hour(s)).    Ordered the above labs and independently reviewed the results.        RADIOLOGY  XR Humerus Min 2 Vws LT    Result Date: 2023  REVIEWING YOUR TEST RESULTS [facility] IS NOT A SUBSTITUTE FOR DISCUSSING THOSE RESULTS WITH YOUR HEALTH CARE PROVIDER. PLEASE CONTACT YOUR PROVIDER [facility] TO DISCUSS ANY QUESTIONS OR CONCERNS YOU MAY HAVE REGARDING THESE TEST RESULTS.  RADIOLOGY REPORT FACILITY:  [facility] SERVICES UNIT/AGE/GENDER: CYNTHIA  OP      AGE:68 Y          SEX:M PATIENT NAME/:  PRABHAKAR HARRISON    1955 UNIT NUMBER:  [id number] ACCOUNT NUMBER:  [id number] ACCESSION NUMBER:  [id number] EXAM: XR HUMERUS MIN 2 VWS LT DATE:  2023 HISTORY: left upper arm pain, initial encounter. COMPARISON: None FINDINGS: There is no fracture or malalignment. The shoulder and elbow joints appear grossly normal. IMPRESSION: Unremarkable left humerus series. Dictated by: Gaeg Darling M.D. Images and Report reviewed and interpreted by: Gage Darling M.D. <PS><Electronically signed by: Gage Darling M.D.> 2023 1840 D: 2023 183 T: 2023 183     I ordered the above noted radiological studies. Independently reviewed by me and discussed with radiologist.  See dictation above for official radiology interpretation.      PROCEDURES    Procedures        MEDICATIONS GIVEN IN ER    Medications   Lidocaine 4 % 1 patch (1 patch Transdermal Medication Applied 23)   ketorolac (TORADOL)  injection 30 mg (30 mg Intramuscular Given 12/12/23 2033)         PROGRESS, DATA ANALYSIS, CONSULTS, AND MEDICAL DECISION MAKING    All labs have been independently interpreted by me.  All radiology studies have been interpreted by me.  Discussion below represents my analysis of pertinent findings related to patient's condition, differential diagnosis, treatment plan and final disposition.    Patient presentation and evaluation consistent with left shoulder strain.  Likely a deltoid tendinitis that he injured from several weeks ago and has aggravated in the past couple of days.  Plan for supportive care and anti-inflammatories with rest as an outpatient.  Follow-up with orthopedics recommended.  All questions answered and close return precautions given.    - Chronic or social conditions impacting care: None      DIFFERENTIAL DIAGNOSIS INCLUDE BUT NOT LIMITED TO:     Left shoulder strain, deltoid tendinitis, rotator cuff strain      Orders placed during this visit:  Orders Placed This Encounter   Procedures    [facility] DME 02.  Shoulder Immobilizer/Sling              AS OF 22:00 EST VITALS:    BP - (!) 140/103  HR - 100  TEMP - 97 °F (36.1 °C) (Tympanic)  02 SATS - 94%    2201 I rechecked the patient.  I discussed the patient's radiology findings (including all incidental findings), diagnosis, and plan for discharge.  A repeat exam reveals no new worrisome changes from my initial exam findings.  The patient understands that the fact that they are being discharged does not denote that nothing is abnormal, it indicates that no clinical emergency is present and that they must follow-up as directed in order to properly maintain their health.  Follow-up instructions (specifically listed below) and return to ER precautions were given at this time.  I specifically instructed the patient to follow-up with their PCP.  The patient understands and agrees with the plan, and is ready for discharge.  All questions  answered.    Stanford Salinas Jr., MD  [address]  [address]  [facility] [address]  [phone]    Schedule an appointment as soon as possible for a visit in 2 days      Mir Padilla II, MD  [address]  [address]  [facility] [id number] [phone]    Schedule an appointment as soon as possible for a visit in 2 days           Medication List        New Prescriptions      lidocaine 5 %  Commonly known as: LIDODERM  Place 1 patch on the skin as directed by provider Daily. Remove & Discard patch within 12 hours or as directed by MD     naproxen 500 MG tablet  Commonly known as: NAPROSYN  Take 1 tablet by mouth 2 (Two) Times a Day As Needed for Mild Pain.               Where to Get Your Medications        These medications were sent to [id number] - [place], [place] - [address] - [phone]  - [phone]   [address], [facility] [address]      Phone: [phone]   lidocaine 5 %  naproxen 500 MG tablet         DIAGNOSIS  Final diagnoses:   Left shoulder strain, initial encounter         DISPOSITION  D/c    Pt masked in first look. I wore a surgical mask throughout my encounters with the pt. I performed hand hygiene on entry into the pt room and upon exit.     Dictated utilizing [employer] dictation     Note Disclaimer: At [facility], we believe that sharing information builds trust and better relationships. You are receiving this note because you recently visited [facility]. It is possible you will see health information before a provider has talked with you about it. This kind of information can be easy to misunderstand. To help you fully understand what it means for your health, we urge you to discuss this note with your provider.      Marshal Weiner PA  12/12/23 6346

## (undated) DEVICE — KT ORCA ORCAPOD DISP STRL

## (undated) DEVICE — ADAPT CLN BIOGUARD AIR/H2O DISP

## (undated) DEVICE — PATIENT RETURN ELECTRODE, SINGLE-USE, CONTACT QUALITY MONITORING, ADULT, WITH 9FT CORD, FOR PATIENTS WEIGING OVER 33LBS. (15KG): Brand: MEGADYNE

## (undated) DEVICE — TUBING, SUCTION, 1/4" X 10', STRAIGHT: Brand: MEDLINE

## (undated) DEVICE — SNAR POLYP SENSATION STDOVL 27 240 BX40

## (undated) DEVICE — THE SINGLE USE ETRAP – POLYP TRAP IS USED FOR SUCTION RETRIEVAL OF ENDOSCOPICALLY REMOVED POLYPS.: Brand: ETRAP

## (undated) DEVICE — CANN O2 ETCO2 FITS ALL CONN CO2 SMPL A/ 7IN DISP LF

## (undated) DEVICE — LN SMPL CO2 SHTRM SD STREAM W/M LUER

## (undated) DEVICE — SENSR O2 OXIMAX FNGR A/ 18IN NONSTR

## (undated) DEVICE — THE TORRENT IRRIGATION SCOPE CONNECTOR IS USED WITH THE TORRENT IRRIGATION TUBING TO PROVIDE IRRIGATION FLUIDS SUCH AS STERILE WATER DURING GASTROINTESTINAL ENDOSCOPIC PROCEDURES WHEN USED IN CONJUNCTION WITH AN IRRIGATION PUMP (OR ELECTROSURGICAL UNIT).: Brand: TORRENT